# Patient Record
Sex: MALE | Race: WHITE | NOT HISPANIC OR LATINO | Employment: FULL TIME | URBAN - METROPOLITAN AREA
[De-identification: names, ages, dates, MRNs, and addresses within clinical notes are randomized per-mention and may not be internally consistent; named-entity substitution may affect disease eponyms.]

---

## 2022-01-14 ENCOUNTER — APPOINTMENT (OUTPATIENT)
Dept: GENERAL RADIOLOGY | Facility: HOSPITAL | Age: 42
End: 2022-01-14

## 2022-01-14 ENCOUNTER — HOSPITAL ENCOUNTER (EMERGENCY)
Facility: HOSPITAL | Age: 42
Discharge: HOME | End: 2022-01-14
Attending: EMERGENCY MEDICINE | Admitting: EMERGENCY MEDICINE

## 2022-01-14 VITALS
OXYGEN SATURATION: 97 % | DIASTOLIC BLOOD PRESSURE: 89 MMHG | BODY MASS INDEX: 35.45 KG/M2 | HEIGHT: 74 IN | SYSTOLIC BLOOD PRESSURE: 140 MMHG | WEIGHT: 276.24 LBS | HEART RATE: 85 BPM | TEMPERATURE: 97.8 F | RESPIRATION RATE: 18 BRPM

## 2022-01-14 DIAGNOSIS — M25.522 LEFT ELBOW PAIN: Primary | ICD-10-CM

## 2022-01-14 PROCEDURE — 99282 EMERGENCY DEPT VISIT SF MDM: CPT

## 2022-01-14 PROCEDURE — 73070 X-RAY EXAM OF ELBOW: CPT

## 2022-01-14 NOTE — DISCHARGE INSTRUCTIONS
Please follow-up with your primary care provider, call for appointment, do not have a primary care provider please utilize patient connection  Please follow-up with orthopedics for further evaluation of your elbow pain  Return to the ED for worsening symptoms

## 2022-01-14 NOTE — ED PROVIDER NOTES
Subjective    Chief Complaint   Patient presents with   • Elbow Injury     L elbow pain x 1 month     Provider, No Known  No LMP for male patient.  No Known Allergies  History Provided By: Patient    Patient is a 41-year-old male presents emergency department with complaint of left elbow pain onset 1 month ago.  Denies any direct trauma or injury pain is worse with movement.  Described as a burning pain.  Patient reports attempting to take ibuprofen with no relief.  Denies fever or chills.  Pain is nonradiating.  No history of IV drug use.          Review of Systems   Constitutional: Negative for chills and fever.   Musculoskeletal:        Left elbow pain   Skin: Negative for rash.       History reviewed. No pertinent past medical history.    No Known Allergies    History reviewed. No pertinent surgical history.    History reviewed. No pertinent family history.    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   Constitutional:       General: He is not in acute distress.     Appearance: Normal appearance. He is not ill-appearing, toxic-appearing or diaphoretic.   Cardiovascular:      Rate and Rhythm: Normal rate and regular rhythm.      Heart sounds: Normal heart sounds.   Pulmonary:      Effort: Pulmonary effort is normal.      Breath sounds: Normal breath sounds.   Musculoskeletal:      Left elbow: No swelling, deformity, effusion or lacerations. Normal range of motion. Tenderness present.      Comments: Mild diffuse tenderness of the left elbow.  No skin change appreciated.  No evidence for septic arthritis.  Bilateral brachial radial pulses 2+.   Skin:     Capillary Refill: Capillary  "refill takes less than 2 seconds.   Neurological:      Mental Status: He is alert and oriented to person, place, and time.         Procedures           ED Course                /89 (BP Location: Right arm, Patient Position: Sitting)   Pulse 85   Temp 97.8 °F (36.6 °C) (Oral)   Resp 18   Ht 188 cm (74\")   Wt 125 kg (276 lb 3.8 oz)   SpO2 97%   BMI 35.47 kg/m²   Labs Reviewed - No data to display  Medications - No data to display  XR ELBOW 2 VIEW LEFT    Result Date: 1/14/2022    Left elbow series demonstrating small olecranon spur.      JAY KONG MD       Electronically Signed and Approved By: JAY KONG MD on 1/14/2022 at 10:26                                              MDM  Appropriate PPE was worn during the duration of the care for this patient while in the emergency department per [facility] Policy    ----Differentials> vital epicondylitis, tendinitis, strain, contusion  This list is not all inclusive and does not constitute the entireity of considered causes.     ED  Course-, Labs, Imaging (reviewed by me, interpreted per ED physician and/or Radiologist---Patient was brought back to the emergency department room for evaluation and placed on appropriate monitoring.    Patient had IV established and blood work obtained.  Radiology and imaging orders as above.    Labs are oriented.  X-ray imaging as above.  No acute findings on x-ray.    Patient will be treated as tinnitus.  Placed on anti-inflammatory medicine given referral for Ortho.    ----Disposition> I spoke with the patient at the bedside regarding their plan of care, discharge instruction, home care, prescriptions, and importance follow-up.  We discussed test results at the bedside, including incidental abnormal labs, radiological findings, understands need for follow-up with primary care or specialist if indicated.      Patient was made aware of indications to return to the emergency department.  Patient agrees with the current " plan of care for discharge, verbalized understanding of all instructions    Pt is aware that discharge does not mean that nothing is wrong but it indicates no emergency is present and they must continue care with follow-up as given below or physician of their choice    Vital signs have  been reviewed. Patient is afebrile. Non toxic in appearance. Alert and oriented to person, place, time and situation.                          Final diagnoses:   Left elbow pain       ED Disposition  ED Disposition     ED Disposition Condition Comment    Discharge Stable           [facility] EMERGENCY ROOM  [address] [facility] [id number] [phone]    As needed, If symptoms worsen    PATIENT CONNECTION - [place] [address]  [phone]  Schedule an appointment as soon as possible for a visit       Been, Justin LAMBERT MD  [address] [place], [place] [id number] [phone]    Schedule an appointment as soon as possible for a visit            Medication List      New Prescriptions    diclofenac 50 MG EC tablet  Commonly known as: VOLTAREN  Take 1 tablet by mouth 2 (Two) Times a Day As Needed (pain).           Where to Get Your Medications      These medications were sent to [id number] - [place], [place] - [address] [place] [address] AT [facility] & [facility] - [phone] [facility] [phone] [address] [address]    Phone: [phone]   · diclofenac 50 MG EC tablet          Michela Morales, APRN  01/14/22 1035

## 2022-01-19 ENCOUNTER — TELEPHONE (OUTPATIENT)
Dept: ORTHOPEDIC SURGERY | Facility: CLINIC | Age: 42
End: 2022-01-19

## 2022-01-19 NOTE — TELEPHONE ENCOUNTER
INCOMING REFERRAL FROM [facility]- LEFT ELBOW PAIN- IMA[facility]; XRAY OF LEFT ELBOW 01.14.22 AT  [facility]

## 2022-02-10 ENCOUNTER — OFFICE VISIT (OUTPATIENT)
Dept: ORTHOPEDIC SURGERY | Facility: CLINIC | Age: 42
End: 2022-02-10

## 2022-02-10 VITALS — WEIGHT: 276 LBS | BODY MASS INDEX: 35.42 KG/M2 | HEIGHT: 74 IN

## 2022-02-10 DIAGNOSIS — M77.12 LATERAL EPICONDYLITIS OF LEFT ELBOW: Primary | ICD-10-CM

## 2022-02-10 PROCEDURE — 99203 OFFICE O/P NEW LOW 30 MIN: CPT | Performed by: ORTHOPAEDIC SURGERY

## 2022-02-10 RX ORDER — METHYLPREDNISOLONE 4 MG/1
TABLET ORAL
Qty: 21 TABLET | Refills: 0 | Status: SHIPPED | OUTPATIENT
Start: 2022-02-10

## 2022-02-10 RX ORDER — NABUMETONE 750 MG/1
750 TABLET, FILM COATED ORAL 2 TIMES DAILY
Qty: 60 TABLET | Refills: 1 | Status: SHIPPED | OUTPATIENT
Start: 2022-02-10

## 2022-02-10 NOTE — PROGRESS NOTES
"Chief Complaint  Initial Evaluation of the Left Elbow     Subjective      Juan R Jones presents to [facility] for an evaluation of left elbow. A couple of months ago he started having left elbow pain with no known injury or trauma. With time the pain progressively got worse. Now he has difficulty with forming a fist and gripping. He points to the lateral epicondyle as his main source pain.     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 Patient is well-developed and normal weight.   HENT:      Head: Normocephalic.      Right Ear: Hearing and external ear normal.      Left Ear: Hearing and external ear normal.      Nose: Nose normal.   Eyes:      Conjunctiva/sclera: Conjunctivae normal.   Cardiovascular:      Rate and Rhythm: Normal rate.   Pulmonary:      Effort: Pulmonary effort is normal.      Breath sounds: No wheezing or rales.   Abdominal:      Palpations: Abdomen is soft.      Tenderness: There is no abdominal tenderness.   Musculoskeletal:      Cervical back: Normal range of motion.   Skin:     Findings: No rash.   Neurological:      Mental Status: Patient is alert and oriented to person, place, and time.   Psychiatric:         Mood and Affect: Mood and affect normal.         Judgment: Judgment normal.       Ortho Exam      LEFT ELBOW: -5 degrees of extension, with pain. Flexion to 90 degrees with pain. Mild swelling. Pain with supination. " Pain about the lateral elbow with wrist flexion and extension. Sensation grossly intact. Neurovascular intact.  Radial pulse 2+, ulnar pulse 2+.       Procedures      Imaging Results (Most Recent)     None           Result Review :         XR ELBOW 2 VIEW LEFT    Result Date: 1/14/2022  Narrative: PROCEDURE: XR ELBOW 2 VW LEFT  COMPARISON: None  INDICATIONS: left elbow pain, no injury  FINDINGS:  No fractures are visualized.  No lytic or sclerotic bone lesions are seen.  Small olecranon spur is evident.  There is no evidence of an abnormal amount of fluid within the joint.      Impression:   Left elbow series demonstrating small olecranon spur.      JAY KONG MD       Electronically Signed and Approved By: JAY KONG MD on 1/14/2022 at 10:26                      Assessment and Plan     DX: Lateral epicondylitis of left elbow    Discussed treatment plans and diagnosis with the patient. At home exercises provided for the patient. Prescription for therapy given. Anti-inflammatory prescribed. He will try an injection if pain is failing to improve. A work note provided for the patient today.      Call or return if worsening symptoms.    Follow Up     4-5 weeks.       Patient was given instructions and counseling regarding his condition or for health maintenance advice. Please see specific information pulled into the AVS if appropriate.     Scribed for Shaneka Gallegos MD by Fannie Arcos.  02/10/22   13:40 EST        I have personally performed the services described in this document as scribed by the above individual and it is both accurate and complete. Shaneka Gallegos MD 02/11/22

## 2022-02-17 ENCOUNTER — DOCUMENTATION (OUTPATIENT)
Dept: ORTHOPEDIC SURGERY | Facility: CLINIC | Age: 42
End: 2022-02-17

## 2022-02-17 NOTE — PROGRESS NOTES
Patients insurance doesn't provide benefits for outpatient physical therapy for the plan they chose, so he doesn't have any PT coverage. Patient had wife call & cancel eval. today, denies offer for s/p rate or any other options. We are going to discharge his orders. -Thank you!        --   Maggi Rubio -   Barbra Davis - .   [facility]  [address] [place] [address].   [place] [address]   [email or web address]

## 2025-01-23 ENCOUNTER — HOSPITAL ENCOUNTER (EMERGENCY)
Facility: HOSPITAL | Age: 45
Discharge: HOME | End: 2025-01-23
Attending: EMERGENCY MEDICINE
Payer: COMMERCIAL

## 2025-01-23 ENCOUNTER — APPOINTMENT (OUTPATIENT)
Dept: GENERAL RADIOLOGY | Facility: HOSPITAL | Age: 45
End: 2025-01-23
Payer: COMMERCIAL

## 2025-01-23 ENCOUNTER — APPOINTMENT (OUTPATIENT)
Dept: CT IMAGING | Facility: HOSPITAL | Age: 45
End: 2025-01-23
Payer: COMMERCIAL

## 2025-01-23 VITALS
WEIGHT: 275.13 LBS | RESPIRATION RATE: 16 BRPM | TEMPERATURE: 98.3 F | HEIGHT: 76 IN | HEART RATE: 80 BPM | SYSTOLIC BLOOD PRESSURE: 160 MMHG | DIASTOLIC BLOOD PRESSURE: 90 MMHG | BODY MASS INDEX: 33.5 KG/M2 | OXYGEN SATURATION: 100 %

## 2025-01-23 DIAGNOSIS — M54.6 ACUTE MIDLINE THORACIC BACK PAIN: Primary | ICD-10-CM

## 2025-01-23 DIAGNOSIS — S22.080A CLOSED WEDGE COMPRESSION FRACTURE OF T12 VERTEBRA, INITIAL ENCOUNTER: ICD-10-CM

## 2025-01-23 PROCEDURE — 25010000002 KETOROLAC TROMETHAMINE PER 15 MG

## 2025-01-23 PROCEDURE — 25010000002 DEXAMETHASONE SODIUM PHOSPHATE 10 MG/ML SOLUTION

## 2025-01-23 PROCEDURE — 96372 THER/PROPH/DIAG INJ SC/IM: CPT

## 2025-01-23 PROCEDURE — 72128 CT CHEST SPINE W/O DYE: CPT

## 2025-01-23 PROCEDURE — 25010000002 ORPHENADRINE CITRATE PER 60 MG

## 2025-01-23 PROCEDURE — 72100 X-RAY EXAM L-S SPINE 2/3 VWS: CPT

## 2025-01-23 PROCEDURE — 99284 EMERGENCY DEPT VISIT MOD MDM: CPT

## 2025-01-23 RX ORDER — ACETAMINOPHEN 500 MG
1000 TABLET ORAL ONCE
Status: COMPLETED | OUTPATIENT
Start: 2025-01-23 | End: 2025-01-23

## 2025-01-23 RX ORDER — KETOROLAC TROMETHAMINE 30 MG/ML
30 INJECTION, SOLUTION INTRAMUSCULAR; INTRAVENOUS ONCE
Status: COMPLETED | OUTPATIENT
Start: 2025-01-23 | End: 2025-01-23

## 2025-01-23 RX ORDER — ORPHENADRINE CITRATE 30 MG/ML
60 INJECTION INTRAMUSCULAR; INTRAVENOUS ONCE
Status: COMPLETED | OUTPATIENT
Start: 2025-01-23 | End: 2025-01-23

## 2025-01-23 RX ORDER — OXYCODONE AND ACETAMINOPHEN 7.5; 325 MG/1; MG/1
1 TABLET ORAL EVERY 6 HOURS PRN
Qty: 12 TABLET | Refills: 0 | Status: SHIPPED | OUTPATIENT
Start: 2025-01-23 | End: 2025-01-26

## 2025-01-23 RX ORDER — DEXAMETHASONE SODIUM PHOSPHATE 10 MG/ML
10 INJECTION, SOLUTION INTRAMUSCULAR; INTRAVENOUS ONCE
Status: COMPLETED | OUTPATIENT
Start: 2025-01-23 | End: 2025-01-23

## 2025-01-23 RX ORDER — LIDOCAINE 4 G/G
1 PATCH TOPICAL ONCE
Status: DISCONTINUED | OUTPATIENT
Start: 2025-01-23 | End: 2025-01-23 | Stop reason: HOSPADM

## 2025-01-23 RX ORDER — METHOCARBAMOL 500 MG/1
500 TABLET, FILM COATED ORAL 4 TIMES DAILY PRN
Qty: 40 TABLET | Refills: 0 | Status: SHIPPED | OUTPATIENT
Start: 2025-01-23 | End: 2025-02-02

## 2025-01-23 RX ADMIN — ORPHENADRINE CITRATE 60 MG: 60 INJECTION INTRAMUSCULAR; INTRAVENOUS at 10:43

## 2025-01-23 RX ADMIN — LIDOCAINE 1 PATCH: 4 PATCH TOPICAL at 10:42

## 2025-01-23 RX ADMIN — ACETAMINOPHEN 1000 MG: 500 TABLET ORAL at 10:41

## 2025-01-23 RX ADMIN — DEXAMETHASONE SODIUM PHOSPHATE 10 MG: 10 INJECTION INTRAMUSCULAR; INTRAVENOUS at 10:43

## 2025-01-23 RX ADMIN — KETOROLAC TROMETHAMINE 30 MG: 30 INJECTION, SOLUTION INTRAMUSCULAR; INTRAVENOUS at 10:43

## 2025-01-23 NOTE — ED PROVIDER NOTES
"SHARED VISIT NOTE:    Patient is 44 y.o. year old male that presents to the ED for evaluation of back pain.     Physical Exam    ED Course:    /96 (BP Location: Left arm, Patient Position: Sitting)   Pulse 85   Temp 98.3 °F (36.8 °C) (Oral)   Resp 18   Ht 193 cm (76\")   Wt 125 kg (275 lb 2.2 oz)   SpO2 100%   BMI 33.49 kg/m²   No results found for this or any previous visit.  Medications   Lidocaine 4 % 1 patch (1 patch Transdermal Medication Applied 1/23/25 1042)   orphenadrine (NORFLEX) injection 60 mg (60 mg Intramuscular Given 1/23/25 1043)   dexAMETHasone sodium phosphate injection 10 mg (10 mg Intramuscular Given 1/23/25 1043)   ketorolac (TORADOL) injection 30 mg (30 mg Intramuscular Given 1/23/25 1043)   acetaminophen (TYLENOL) tablet 1,000 mg (1,000 mg Oral Given 1/23/25 1041)     XR Spine Lumbar 2 or 3 View    Result Date: 1/23/2025  Narrative: XR SPINE LUMBAR 2 OR 3 VW Date of Exam: 1/23/2025 10:31 AM EST Indication: low back pain injury Comparison: None available. Findings: Mild wedging of the T12 vertebral body, age indeterminate but no fracture line is identified. The space heights are well-maintained. Bone mineralization is normal. SI joints are normal.     Impression: Impression: Mild wedging of T12 vertebral body, age indeterminate, but no fracture line is identified. Recommend correlation with point tenderness. Electronically Signed: Apollo Rivera MD  1/23/2025 10:46 AM EST  Workstation ID: [id number]     MDM:    Procedures              SHARED VISIT ATTESTATION:    This visit was performed by both myself and an APC.  I performed the substantive portion of the medical decision making. The management plan was made or approved by me, and I take responsibility for patient management.           Rc Nlul MD  11:11 EST  01/23/25         Rc Null MD  01/23/25 1111    "

## 2025-01-23 NOTE — ED PROVIDER NOTES
Time: 10:13 AM EST  Date of encounter:  1/23/2025  Independent Historian/Clinical History and Information was obtained by:   Patient    History is limited by: N/A    Chief Complaint: Back pain      History of Present Illness:  Patient is a 44 y.o. year old male who presents to the emergency department for evaluation of neck pain.  Patient reports on Tuesday he was chopping wood and then went to push his truck as it would not start and felt a pop in his lower back area.  Had immediate pain to the low back.  Pain has persisted since.  States it is worse when he straightens, feels better leaning forward.  Denies paresthesias, saddle anesthesia, bowel or bladder incontinence, urinary retention, fever.      Patient Care Team  Primary Care Provider: Vee Díaz APRN    Past Medical History:     No Known Allergies  History reviewed. No pertinent past medical history.  History reviewed. No pertinent surgical history.  History reviewed. No pertinent family history.    Home Medications:  Prior to Admission medications    Medication Sig Start Date End Date Taking? Authorizing Provider   diclofenac (VOLTAREN) 50 MG EC tablet Take 1 tablet by mouth 2 (Two) Times a Day As Needed (pain). 1/14/22   Michela Morales APRN   methylPREDNISolone (MEDROL) 4 MG dose pack Use as directed by package instructions 2/10/22   Shaneka Gallegos MD   nabumetone (Relafen) 750 MG tablet Take 1 tablet by mouth 2 (Two) Times a Day. 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   HENT:  Negative for  "congestion, ear pain, sinus pain and tinnitus.    Eyes:  Negative for photophobia and visual disturbance.   Respiratory:  Negative for cough and shortness of breath.    Cardiovascular:  Negative for chest pain, palpitations and leg swelling.   Gastrointestinal:  Negative for abdominal pain, nausea and vomiting.   Genitourinary:  Negative for dysuria.   Musculoskeletal:  Positive for back pain and gait problem. Negative for arthralgias, joint swelling, myalgias, neck pain and neck stiffness.   Skin:  Negative for color change and rash.   Neurological:  Negative for dizziness, tremors, syncope, weakness, light-headedness and numbness.   Hematological:  Negative for adenopathy.   Psychiatric/Behavioral:  Negative for agitation and confusion.         Physical Exam:  /90 (BP Location: Right arm, Patient Position: Sitting)   Pulse 80   Temp 98.3 °F (36.8 °C) (Oral)   Resp 16   Ht 193 cm (76\")   Wt 125 kg (275 lb 2.2 oz)   SpO2 100%   BMI 33.49 kg/m²     Physical Exam  Vitals and nursing note reviewed.   Constitutional:       Appearance: Normal appearance.   HENT:      Head: Normocephalic.   Eyes:      Extraocular Movements: Extraocular movements intact.      Conjunctiva/sclera: Conjunctivae normal.   Pulmonary:      Effort: Pulmonary effort is normal.   Abdominal:      General: There is no distension.   Musculoskeletal:         General: Normal range of motion.      Comments: Positive straight leg raise on right.  Bilateral patellar reflexes intact.  No tenderness to palpation to T-spine or L-spine.  No paraspinal musculature tenderness to palpation however due note muscular tightness TTP paraspinal musculature of the lumbar spine.  CMS intact.   Skin:     General: Skin is warm and dry.      Capillary Refill: Capillary refill takes less than 2 seconds.      Coloration: Skin is not cyanotic.   Neurological:      General: No focal deficit present.      Mental Status: He is alert and oriented to person, place, " and time.      Cranial Nerves: No cranial nerve deficit.      Sensory: No sensory deficit.      Motor: No weakness.      Deep Tendon Reflexes: Reflexes normal.   Psychiatric:         Attention and Perception: Attention and perception normal.         Mood and Affect: Mood normal.         Behavior: Behavior normal.                    Medical Decision Making:      Comorbidities that affect care:    None    External Notes reviewed:    Previous Clinic Note: Orthopedics visit for lateral epicondylitis 2022      The following orders were placed and all results were independently analyzed by me:  Orders Placed This Encounter   Procedures    XR Spine Lumbar 2 or 3 View    CT Thoracic Spine Without Contrast    Ambulatory Referral to Neurosurgery       Medications Given in the Emergency Department:  Medications   Lidocaine 4 % 1 patch (1 patch Transdermal Medication Applied 1/23/25 1042)   orphenadrine (NORFLEX) injection 60 mg (60 mg Intramuscular Given 1/23/25 1043)   dexAMETHasone sodium phosphate injection 10 mg (10 mg Intramuscular Given 1/23/25 1043)   ketorolac (TORADOL) injection 30 mg (30 mg Intramuscular Given 1/23/25 1043)   acetaminophen (TYLENOL) tablet 1,000 mg (1,000 mg Oral Given 1/23/25 1041)        ED Course:    ED Course as of 01/23/25 1514   Thu Jan 23, 2025   1125 Spoke with CT who recommends obtaining thoracic scan above and below injury and not obtaining L-spine. [AS]      ED Course User Index  [AS] Sanjana Espinal PA-C       Labs:    Lab Results (last 24 hours)       ** No results found for the last 24 hours. **             Imaging:    CT Thoracic Spine Without Contrast    Result Date: 1/23/2025  CT THORACIC SPINE WO CONTRAST Date of Exam: 1/23/2025 11:36 AM EST Indication: mid back pain, xr done. Comparison: None available. Technique: Axial CT images were obtained of the thoracic spine without contrast administration.  Reconstructed coronal and sagittal images were also obtained. Automated  exposure control and iterative construction methods were used. Findings: No acute fracture or dislocation is identified. There is mild wedging of the T12 vertebral body as well as the T11 vertebral body, without fracture line, and consistent with chronic fracture. No epidural hematoma is identified. No significant disc bulges  are identified. Limited evaluation of the lung bases is unremarkable. Evaluation of the adjacent soft tissues is unremarkable. No aggressive appearing lytic or sclerotic bone lesions.     Impression: 1.No acute fracture or dislocation. 2.Mild wedging of the T11 and T12 vertebral bodies without fracture line, consistent with chronic compression. Electronically Signed: Apollo Rivera MD  1/23/2025 12:16 PM EST  Workstation ID: [id number]    XR Spine Lumbar 2 or 3 View    Result Date: 1/23/2025  XR SPINE LUMBAR 2 OR 3 VW Date of Exam: 1/23/2025 10:31 AM EST Indication: low back pain injury Comparison: None available. Findings: Mild wedging of the T12 vertebral body, age indeterminate but no fracture line is identified. The space heights are well-maintained. Bone mineralization is normal. SI joints are normal.     Impression: Mild wedging of T12 vertebral body, age indeterminate, but no fracture line is identified. Recommend correlation with point tenderness. Electronically Signed: Apollo Rivera MD  1/23/2025 10:46 AM EST  Workstation ID: [id number]       Differential Diagnosis and Discussion:    Back Pain: The patient presents with back pain. My differential diagnosis includes but is not limited to acute spinal epidural abscess, acute spinal epidural bleed, cauda equina syndrome, abdominal aortic aneurysm, aortic dissection, kidney stone, pyelonephritis, musculoskeletal back pain, spinal fracture, and osteoarthritis.     PROCEDURES:    X-ray were performed in the emergency department and all X-ray impressions were independently interpreted by me.  CT scan was performed in the emergency department and  the CT scan radiology impression was interpreted by me.    No orders to display       Procedures    MDM     Amount and/or Complexity of Data Reviewed  Tests in the radiology section of CPT®: ordered and reviewed  Review and summarize past medical records: yes  Discuss the patient with other providers: yes  Independent visualization of images, tracings, or specimens: yes    Risk of Complications, Morbidity, and/or Mortality  Presenting problems: low  Diagnostic procedures: moderate  Management options: moderate    Patient Progress  Patient progress: improved                       Patient Care Considerations:    SEPSIS was considered but is NOT present in the emergency department as SIRS criteria is not present. MRI: I considered ordering an MRI however patient has no neurological deficit on examination, no tenderness to palpation on examination.      Consultants/Shared Management Plan:    SHARED VISIT: I have discussed the case with my supervising physician, Dr. Null who states obtain CT for further evaluation. The substantive portion of the medical decision was made by the attesting physician who made or approve the management plan and will take responsibility for the patient.  Clinical findings were discussed and ultimate disposition was made in consult with supervising physician.    Social Determinants of Health:    Patient is independent, reliable, and has access to care.       Disposition and Care Coordination:    Discharged: The patient is suitable and stable for discharge with no need for consideration of admission.    I have explained the patient´s condition, diagnoses and treatment plan based on the information available to me at this time. I have answered questions and addressed any concerns. The patient has a good  understanding of the patient´s diagnosis, condition, and treatment plan as can be expected at this point. The vital signs have been stable. The patient´s condition is stable and appropriate  for discharge from the emergency department.      The patient will pursue further outpatient evaluation with the primary care physician or other designated or consulting physician as outlined in the discharge instructions. They are agreeable to this plan of care and follow-up instructions have been explained in detail. The patient has received these instructions in written format and has expressed an understanding of the discharge instructions. The patient is aware that any significant change in condition or worsening of symptoms should prompt an immediate return to this or the closest emergency department or call to 911.  I have explained discharge medications and the need for follow up with the patient/caretakers. This was also printed in the discharge instructions. Patient was discharged with the following medications and follow up:      Medication List        New Prescriptions      methocarbamol 500 MG tablet  Commonly known as: ROBAXIN  Take 1 tablet by mouth 4 (Four) Times a Day As Needed for Muscle Spasms for up to 10 days.     oxyCODONE-acetaminophen 7.5-325 MG per tablet  Commonly known as: PERCOCET  Take 1 tablet by mouth Every 6 (Six) Hours As Needed for Severe Pain for up to 3 days.               Where to Get Your Medications        These medications were sent to [facility] #[id number] - [place], [place] - [address] [place] [address] AT [facility] [phone] [facility] [phone] [address] [address]      Phone: [phone]   methocarbamol 500 MG tablet  oxyCODONE-acetaminophen 7.5-325 MG per tablet      Yandel Swann MD  [address] [place], [place] [id number] [phone]             Final diagnoses:   Acute midline thoracic back pain   Closed wedge compression fracture of T12 vertebra, initial encounter        ED Disposition       ED Disposition   Discharge    Condition   Stable    Comment   --               This medical record created using voice  recognition software.             Sanjana Espinal PA-C  01/23/25 4617

## 2025-01-23 NOTE — Clinical Note
[facility] EMERGENCY ROOM  [address]  [place] [address]  Phone: [phone]  Fax: [phone]    Juan R Jones was seen and treated in our emergency department on 1/23/2025.  He may return to work on 01/27/2025.  Light duty until Monday, longer if still in pain       Thank you for choosing [facility].    Sanjana Espinal, ILEANA
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
## 2025-01-30 ENCOUNTER — OFFICE VISIT (OUTPATIENT)
Dept: NEUROSURGERY | Facility: CLINIC | Age: 45
End: 2025-01-30
Payer: COMMERCIAL

## 2025-01-30 VITALS
BODY MASS INDEX: 33.13 KG/M2 | DIASTOLIC BLOOD PRESSURE: 90 MMHG | SYSTOLIC BLOOD PRESSURE: 136 MMHG | WEIGHT: 272.1 LBS | HEIGHT: 76 IN

## 2025-01-30 DIAGNOSIS — M54.50 ACUTE MIDLINE LOW BACK PAIN WITHOUT SCIATICA: Primary | ICD-10-CM
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
## 2025-01-31 ENCOUNTER — PATIENT ROUNDING (BHMG ONLY) (OUTPATIENT)
Dept: NEUROSURGERY | Facility: CLINIC | Age: 45
End: 2025-01-31
Payer: COMMERCIAL